# Patient Record
Sex: MALE | Race: OTHER | HISPANIC OR LATINO
[De-identification: names, ages, dates, MRNs, and addresses within clinical notes are randomized per-mention and may not be internally consistent; named-entity substitution may affect disease eponyms.]

---

## 2023-08-22 ENCOUNTER — TRANSCRIPTION ENCOUNTER (OUTPATIENT)
Age: 38
End: 2023-08-22

## 2023-08-22 ENCOUNTER — RESULT REVIEW (OUTPATIENT)
Age: 38
End: 2023-08-22

## 2023-09-05 PROBLEM — Z00.00 ENCOUNTER FOR PREVENTIVE HEALTH EXAMINATION: Status: ACTIVE | Noted: 2023-09-05

## 2023-09-06 ENCOUNTER — APPOINTMENT (OUTPATIENT)
Dept: SURGERY | Facility: CLINIC | Age: 38
End: 2023-09-06
Payer: COMMERCIAL

## 2023-09-06 VITALS — SYSTOLIC BLOOD PRESSURE: 114 MMHG | DIASTOLIC BLOOD PRESSURE: 73 MMHG | HEART RATE: 82 BPM | TEMPERATURE: 97.7 F

## 2023-09-06 DIAGNOSIS — Z90.49 ACQUIRED ABSENCE OF OTHER SPECIFIED PARTS OF DIGESTIVE TRACT: ICD-10-CM

## 2023-09-06 PROCEDURE — 99024 POSTOP FOLLOW-UP VISIT: CPT

## 2023-09-06 NOTE — ASSESSMENT
[FreeTextEntry1] : post op laparoscopic appendectomy with Dr. Young.  He is doing well and has no complaints.  - gradually increase activities - follow up ass needed

## 2023-09-06 NOTE — PHYSICAL EXAM
[JVD] : no jugular venous distention  [Normal Breath Sounds] : Normal breath sounds [de-identified] : soft, NT/ND, incisions well healed

## 2023-09-06 NOTE — HISTORY OF PRESENT ILLNESS
[de-identified] : post op appendectomy  pathology reviewed [de-identified] : Doing well.  Has no complaints.